# Patient Record
Sex: MALE | Race: OTHER | Employment: UNEMPLOYED | URBAN - METROPOLITAN AREA
[De-identification: names, ages, dates, MRNs, and addresses within clinical notes are randomized per-mention and may not be internally consistent; named-entity substitution may affect disease eponyms.]

---

## 2023-03-04 ENCOUNTER — HOSPITAL ENCOUNTER (EMERGENCY)
Age: 33
Discharge: HOME | End: 2023-03-04
Attending: STUDENT IN AN ORGANIZED HEALTH CARE EDUCATION/TRAINING PROGRAM

## 2023-03-04 VITALS
DIASTOLIC BLOOD PRESSURE: 86 MMHG | WEIGHT: 220 LBS | SYSTOLIC BLOOD PRESSURE: 142 MMHG | HEART RATE: 86 BPM | TEMPERATURE: 98.5 F | OXYGEN SATURATION: 98 % | RESPIRATION RATE: 18 BRPM

## 2023-03-04 DIAGNOSIS — K62.5 RECTAL BLEED: Primary | ICD-10-CM

## 2023-03-04 LAB
ALBUMIN SERPL-MCNC: 4.7 GM/DL (ref 3.4–5)
ALP BLD-CCNC: 77 IU/L (ref 40–129)
ALT SERPL-CCNC: 23 U/L (ref 10–40)
ANION GAP SERPL CALCULATED.3IONS-SCNC: 9 MMOL/L (ref 4–16)
AST SERPL-CCNC: 16 IU/L (ref 15–37)
BASOPHILS ABSOLUTE: 0 K/CU MM
BASOPHILS RELATIVE PERCENT: 0.4 % (ref 0–1)
BILIRUB SERPL-MCNC: 0.4 MG/DL (ref 0–1)
BUN SERPL-MCNC: 17 MG/DL (ref 6–23)
CALCIUM SERPL-MCNC: 9.3 MG/DL (ref 8.3–10.6)
CHLORIDE BLD-SCNC: 100 MMOL/L (ref 99–110)
CO2: 26 MMOL/L (ref 21–32)
CREAT SERPL-MCNC: 0.7 MG/DL (ref 0.9–1.3)
DIFFERENTIAL TYPE: ABNORMAL
EOSINOPHILS ABSOLUTE: 0.3 K/CU MM
EOSINOPHILS RELATIVE PERCENT: 2.6 % (ref 0–3)
GFR SERPL CREATININE-BSD FRML MDRD: >60 ML/MIN/1.73M2
GLUCOSE SERPL-MCNC: 101 MG/DL (ref 70–99)
HCT VFR BLD CALC: 44.8 % (ref 42–52)
HEMOGLOBIN: 14.9 GM/DL (ref 13.5–18)
IMMATURE NEUTROPHIL %: 0.3 % (ref 0–0.43)
LIPASE: 15 IU/L (ref 13–60)
LYMPHOCYTES ABSOLUTE: 4.3 K/CU MM
LYMPHOCYTES RELATIVE PERCENT: 42.4 % (ref 24–44)
MCH RBC QN AUTO: 28.5 PG (ref 27–31)
MCHC RBC AUTO-ENTMCNC: 33.3 % (ref 32–36)
MCV RBC AUTO: 85.7 FL (ref 78–100)
MONOCYTES ABSOLUTE: 0.4 K/CU MM
MONOCYTES RELATIVE PERCENT: 4.1 % (ref 0–4)
NUCLEATED RBC %: 0 %
PDW BLD-RTO: 12 % (ref 11.7–14.9)
PLATELET # BLD: 217 K/CU MM (ref 140–440)
PMV BLD AUTO: 11.6 FL (ref 7.5–11.1)
POTASSIUM SERPL-SCNC: 3.8 MMOL/L (ref 3.5–5.1)
RBC # BLD: 5.23 M/CU MM (ref 4.6–6.2)
SEGMENTED NEUTROPHILS ABSOLUTE COUNT: 5.1 K/CU MM
SEGMENTED NEUTROPHILS RELATIVE PERCENT: 50.2 % (ref 36–66)
SODIUM BLD-SCNC: 135 MMOL/L (ref 135–145)
TOTAL IMMATURE NEUTOROPHIL: 0.03 K/CU MM
TOTAL NUCLEATED RBC: 0 K/CU MM
TOTAL PROTEIN: 7.9 GM/DL (ref 6.4–8.2)
WBC # BLD: 10.1 K/CU MM (ref 4–10.5)

## 2023-03-04 PROCEDURE — 83690 ASSAY OF LIPASE: CPT

## 2023-03-04 PROCEDURE — 80053 COMPREHEN METABOLIC PANEL: CPT

## 2023-03-04 PROCEDURE — 85025 COMPLETE CBC W/AUTO DIFF WBC: CPT

## 2023-03-04 PROCEDURE — 99283 EMERGENCY DEPT VISIT LOW MDM: CPT

## 2023-03-04 RX ORDER — DOCUSATE SODIUM 100 MG/1
100 CAPSULE, LIQUID FILLED ORAL 2 TIMES DAILY
Qty: 28 CAPSULE | Refills: 0
Start: 2023-03-04 | End: 2023-03-18

## 2023-03-04 NOTE — DISCHARGE INSTRUCTIONS
Follow-up with primary care soon as possible  Follow-up with gastroenterology per referral  Ensure adequate hydration healthy nutrition  Take Colace as needed for constipation

## 2023-03-04 NOTE — ED PROVIDER NOTES
Emergency Department Encounter    Patient: Estrella Rivera  MRN: [id number]  : 1990  Date of Evaluation: 3/5/2023  ED Provider:  Nicolas Agudelo DO    Triage Chief Complaint:   Rectal Bleeding    [place]:  Estrella Rivera is a 28 y.o. male who presents to the ED with a history of rectal bleeding. Patient says he has been having bright red blood in his stool since yesterday. Patient also in endorses a history of straining while using the bathroom. No history of headaches, change in mentation, shortness of breath, chest pain, palpitations, abdominal pain, or generalized weakness. Patient is not on blood thinners and has no bleeding disorders. ROS - see HPI, below listed is current ROS at time of my eval:  Review of Systems    ROS is an in history; otherwise unremarkable    No past medical history on file. No past surgical history on file. No family history on file. Social History     Socioeconomic History    Marital status:      Spouse name: Not on file    Number of children: Not on file    Years of education: Not on file    Highest education level: Not on file   Occupational History    Not on file   Tobacco Use    Smoking status: Not on file    Smokeless tobacco: Not on file   Substance and Sexual Activity    Alcohol use: Not on file    Drug use: Not on file    Sexual activity: Not on file   Other Topics Concern    Not on file   Social History Narrative    Not on file     Social Determinants of Health     Financial Resource Strain: Not on file   Food Insecurity: Not on file   Transportation Needs: Not on file   Physical Activity: Not on file   Stress: Not on file   Social Connections: Not on file   Intimate Partner Violence: Not on file   Housing Stability: Not on file     No current facility-administered medications for this encounter.      Current Outpatient Medications   Medication Sig Dispense Refill    docusate sodium (COLACE) 100 MG capsule Take 1 capsule by mouth 2 times daily for 14 days 28 capsule 0     No Known Allergies    Nursing Notes Reviewed    Physical Exam:  Triage VS:    ED Triage Vitals [03/04/23 1644]   Enc Vitals Group      BP (!) 152/98      Heart Rate 93      Resp 18      Temp 98.5 °F (36.9 °C)      Temp Source Oral      SpO2 98 %      Weight 220 lb (99.8 kg)      Height       Head Circumference       Peak Flow       Pain Score       Pain Loc       Pain Edu? Excl. in [address]? Physical Exam    My pulse ox interpretation is - normal    General appearance:  No acute distress. Skin:  Warm. Dry. Eye:  Extraocular movements intact. Ears, nose, mouth and throat:  Oral mucosa moist   Neck:  Trachea midline. Extremity:  No obvious deformity, erythema, or warmth. No swelling. Normal ROM. Distal pulses intact. Heart:  Regular rate and rhythm, normal S1 & S2, no extra heart sounds. Perfusion:  intact  Respiratory:  Lungs clear to auscultation bilaterally. Respirations nonlabored. Abdominal:  Normal bowel sounds. Soft. Nontender. Non distended. No Organomegaly. No stevenson, Mcburney, Rovsing, fluctuance, shifting dullness  Back:  No CVA tenderness to palpation     Neurological:  Alert and oriented times 3. No focal neuro deficits.              Psychiatric:  Appropriate    I have reviewed and interpreted all of the currently available lab results from this visit (if applicable):  Results for orders placed or performed during the hospital encounter of 03/04/23   Comprehensive Metabolic Panel   Result Value Ref Range    Sodium 135 135 - 145 MMOL/L    Potassium 3.8 3.5 - 5.1 MMOL/L    Chloride 100 99 - 110 mMol/L    CO2 26 21 - 32 MMOL/L    BUN 17 6 - 23 MG/DL    Creatinine 0.7 (L) 0.9 - 1.3 MG/DL    Est, Glom Filt Rate >60 >60 mL/min/1.73m2    Glucose 101 (H) 70 - 99 MG/DL    Calcium 9.3 8.3 - 10.6 MG/DL    Albumin 4.7 3.4 - 5.0 GM/DL    Total Protein 7.9 6.4 - 8.2 GM/DL    Total Bilirubin 0.4 0.0 - 1.0 MG/DL    ALT 23 10 - 40 U/L    AST 16 15 - 37 IU/L Alkaline Phosphatase 77 40 - 129 IU/L    Anion Gap 9 4 - 16   CBC with Auto Differential   Result Value Ref Range    WBC 10.1 4.0 - 10.5 K/CU MM    RBC 5.23 4.6 - 6.2 M/CU MM    Hemoglobin 14.9 13.5 - 18.0 GM/DL    Hematocrit 44.8 42 - 52 %    MCV 85.7 78 - 100 FL    MCH 28.5 27 - 31 PG    MCHC 33.3 32.0 - 36.0 %    RDW 12.0 11.7 - 14.9 %    Platelets 523 257 - 477 K/CU MM    MPV 11.6 (H) 7.5 - 11.1 FL    Differential Type AUTOMATED DIFFERENTIAL     Segs Relative 50.2 36 - 66 %    Lymphocytes % 42.4 24 - 44 %    Monocytes % 4.1 (H) 0 - 4 %    Eosinophils % 2.6 0 - 3 %    Basophils % 0.4 0 - 1 %    Segs Absolute 5.1 K/CU MM    Lymphocytes Absolute 4.3 K/CU MM    Monocytes Absolute 0.4 K/CU MM    Eosinophils Absolute 0.3 K/CU MM    Basophils Absolute 0.0 K/CU MM    Nucleated RBC % 0.0 %    Total Nucleated RBC 0.0 K/CU MM    Total Immature Neutrophil 0.03 K/CU MM    Immature Neutrophil % 0.3 0 - 0.43 %   Lipase   Result Value Ref Range    Lipase 15 13 - 60 IU/L      Radiographs (if obtained):  Radiologist's Report Reviewed:  No orders to display         EKG (if obtained): (All EKG's are interpreted by myself in the absence of a cardiologist)    CRITICAL CARE NOTE:    MDM:    History source:  Patient   History Limitations: none    28years old male who presents to the ED with a history of rectal bleeding. Patient says he has been having bright red blood in his stool since yesterday. Patient also in endorses a history of straining while using the bathroom. No history of headaches, change in mentation, shortness of breath, chest pain, palpitations, abdominal pain, or generalized weakness. Patient is not on blood thinners and has no bleeding disorders. Physical examination is unremarkable. No hemorrhoid on rectal examination. Differentials considered include, but are not limited to include  Hemorrhoids, angiodysplasia, diverticulosis, bleeding disorder, thrombocytopenia, trauma.           Previous notes reviewed:   none    EKG     Laboratory evaluations  --Considered: CBC, Electrolytes, Lipase, UA, Lactic acid, troponin, Stool Studies, C. Diff  --Done:CBC, electrolytes, lipase  -- Significant results: negative labs    Radiology include:  ---Considered: CT-abdomen/Pelvis, US  --- Done: none  --Significant results n/a      Medications:   none    Consults   none    Social Determinants affecting management or disposition:  none    Reevaluation:   No new complaints     Disposition   - Considered: discharge    - Final Disposition      80-year-old male who presented to the ED with a history of rectal bleeding since yesterday. Physical examination is unremarkable. Patient's laboratory evaluation is also unremarkable. Evaluated patient does not have any symptoms at the time of the encounter. Patient is discharged and advised to follow-up with primary care. Patient also given gastroenterology follow-up referral.    Patient given return instructions to the ED in event of significant and persistent rectal bleeding, with headache, shortness of breath, palpitation or any other symptom of concern. Patient is given opportunity to ask questions and all questions were answered in appropriate detail. Patient verbalized understanding and agreement with the plan. Clinical Impression:  1.  Rectal bleed      Disposition referral (if applicable):  MD Saurabh Wilson [address]  [phone]    Schedule an appointment as soon as possible for a visit in 1 day    Disposition medications (if applicable):  Discharge Medication List as of 3/4/2023  6:40 PM        START taking these medications    Details   docusate sodium (COLACE) 100 MG capsule Take 1 capsule by mouth 2 times daily for 14 days, Disp-28 capsule, [id number] PRINT           ED Provider Disposition Time  DISPOSITION Decision To Discharge 03/04/2023 06:21:59 PM      Comment: Please note this report has been produced using speech recognition software and may contain errors related to that system including errors in grammar, punctuation, and spelling, as well as words and phrases that may be inappropriate.  Efforts were made to edit the dictations.        Arminda Galindo DO  03/05/23 110